# Patient Record
Sex: FEMALE | Race: WHITE | NOT HISPANIC OR LATINO | Employment: FULL TIME | URBAN - METROPOLITAN AREA
[De-identification: names, ages, dates, MRNs, and addresses within clinical notes are randomized per-mention and may not be internally consistent; named-entity substitution may affect disease eponyms.]

---

## 2023-04-17 DIAGNOSIS — F41.8 ANXIETY ASSOCIATED WITH DEPRESSION: ICD-10-CM

## 2023-04-17 PROBLEM — M79.10 MYALGIA: Status: ACTIVE | Noted: 2023-04-17

## 2023-04-17 PROBLEM — R10.31 ABDOMINAL PAIN, ACUTE, RIGHT LOWER QUADRANT: Status: ACTIVE | Noted: 2023-04-17

## 2023-04-17 PROBLEM — R00.0 ELEVATED PULSE RATE: Status: ACTIVE | Noted: 2023-04-17

## 2023-04-17 PROBLEM — J45.990 ASTHMA, EXERCISE INDUCED (HHS-HCC): Status: ACTIVE | Noted: 2023-04-17

## 2023-04-17 PROBLEM — B37.9 YEAST INFECTION: Status: ACTIVE | Noted: 2023-04-17

## 2023-04-17 PROBLEM — N20.0 RIGHT KIDNEY STONE: Status: ACTIVE | Noted: 2023-04-17

## 2023-04-17 PROBLEM — R06.81 WITNESSED APNEIC SPELLS: Status: ACTIVE | Noted: 2023-04-17

## 2023-04-17 PROBLEM — B00.1 COLD SORE: Status: ACTIVE | Noted: 2023-04-17

## 2023-04-17 PROBLEM — R07.89 ATYPICAL CHEST PAIN: Status: ACTIVE | Noted: 2023-04-17

## 2023-04-17 PROBLEM — R18.8 FREE FLUID IN PELVIS: Status: ACTIVE | Noted: 2023-04-17

## 2023-04-17 PROBLEM — A56.09 CHLAMYDIAL CERVICITIS: Status: ACTIVE | Noted: 2023-04-17

## 2023-04-17 PROBLEM — R35.0 URINARY FREQUENCY: Status: ACTIVE | Noted: 2023-04-17

## 2023-04-17 PROBLEM — R11.2 NAUSEA AND/OR VOMITING: Status: ACTIVE | Noted: 2023-04-17

## 2023-04-17 PROBLEM — H52.13 MYOPIA OF BOTH EYES: Status: ACTIVE | Noted: 2023-04-17

## 2023-04-17 PROBLEM — R07.89 CHEST PRESSURE: Status: ACTIVE | Noted: 2023-04-17

## 2023-04-17 PROBLEM — H47.093 ASYMMETRY OF OPTIC NERVE OF BOTH EYES: Status: ACTIVE | Noted: 2023-04-17

## 2023-04-17 PROBLEM — K64.5 THROMBOSED EXTERNAL HEMORRHOID: Status: ACTIVE | Noted: 2023-04-17

## 2023-04-17 PROBLEM — R00.2 HEART PALPITATIONS: Status: ACTIVE | Noted: 2023-04-17

## 2023-04-17 RX ORDER — BUPROPION HYDROCHLORIDE 300 MG/1
300 TABLET ORAL DAILY
Qty: 90 TABLET | Refills: 1 | Status: SHIPPED | OUTPATIENT
Start: 2023-04-17 | End: 2023-08-28 | Stop reason: SDUPTHER

## 2023-04-17 RX ORDER — BUPROPION HYDROCHLORIDE 300 MG/1
300 TABLET ORAL DAILY
COMMUNITY
End: 2023-04-17 | Stop reason: SDUPTHER

## 2023-08-28 DIAGNOSIS — F41.8 ANXIETY ASSOCIATED WITH DEPRESSION: ICD-10-CM

## 2023-08-28 PROBLEM — R09.81 NASAL CONGESTION: Status: ACTIVE | Noted: 2023-08-28

## 2023-08-28 PROBLEM — R49.0 HOARSENESS: Status: ACTIVE | Noted: 2023-08-28

## 2023-08-28 PROBLEM — R39.89 SUSPECTED UTI: Status: ACTIVE | Noted: 2023-08-28

## 2023-08-28 PROBLEM — R30.0 DYSURIA: Status: ACTIVE | Noted: 2023-08-28

## 2023-08-28 PROBLEM — L98.9 SKIN LESION OF FACE: Status: ACTIVE | Noted: 2023-08-28

## 2023-08-28 PROBLEM — R39.9 UTI SYMPTOMS: Status: ACTIVE | Noted: 2023-08-28

## 2023-08-28 PROBLEM — D23.39 OTHER BENIGN NEOPLASM OF SKIN OF OTHER PARTS OF FACE: Status: ACTIVE | Noted: 2023-03-21

## 2023-08-28 PROBLEM — B00.9 HERPESVIRAL INFECTION, UNSPECIFIED: Status: ACTIVE | Noted: 2023-03-21

## 2023-08-28 RX ORDER — BUPROPION HYDROCHLORIDE 150 MG/1
150 TABLET ORAL DAILY
COMMUNITY
End: 2023-08-31 | Stop reason: SDUPTHER

## 2023-08-28 RX ORDER — BUPROPION HYDROCHLORIDE 300 MG/1
300 TABLET ORAL DAILY
Qty: 90 TABLET | Refills: 1 | Status: SHIPPED | OUTPATIENT
Start: 2023-08-28 | End: 2023-12-14 | Stop reason: SDUPTHER

## 2023-08-28 RX ORDER — BUPROPION HYDROCHLORIDE 150 MG/1
150 TABLET ORAL DAILY
Qty: 90 TABLET | Refills: 1 | OUTPATIENT
Start: 2023-08-28

## 2023-08-29 LAB
CHLAMYDIA TRACH., AMPLIFIED: NEGATIVE
N. GONORRHEA, AMPLIFIED: NEGATIVE
TRICHOMONAS VAGINALIS: NEGATIVE

## 2023-08-31 DIAGNOSIS — F41.8 ANXIETY ASSOCIATED WITH DEPRESSION: Primary | ICD-10-CM

## 2023-08-31 RX ORDER — BUPROPION HYDROCHLORIDE 150 MG/1
150 TABLET ORAL DAILY
Qty: 90 TABLET | Refills: 1 | Status: SHIPPED | OUTPATIENT
Start: 2023-08-31 | End: 2023-12-14 | Stop reason: SDUPTHER

## 2023-08-31 NOTE — TELEPHONE ENCOUNTER
Best time to test is 3-5 days after start of symptoms to be most accurate; sometime first may be negative and next day it is positive   Would continue tylenol and/or advil cold and sinus or cold and flu preparations to help manage symptoms  Too soon to know if viral vs bacterial   If symptoms worsen over weekend and covid is negative can go to UC for evaluation

## 2023-08-31 NOTE — TELEPHONE ENCOUNTER
Patient called having fever, chills, congestion since yesterday, sinus pressure, feels awful can't focus has not taken a covid yest too sick,   Should she take one today or wait??  OTC claritin dayquil Advil

## 2023-11-16 RX ORDER — ONDANSETRON 4 MG/1
4 TABLET, ORALLY DISINTEGRATING ORAL
COMMUNITY
Start: 2021-09-01 | End: 2023-12-14 | Stop reason: WASHOUT

## 2023-11-16 RX ORDER — HYDROCORTISONE 25 MG/G
CREAM TOPICAL
COMMUNITY
Start: 2022-02-18 | End: 2023-12-14 | Stop reason: WASHOUT

## 2023-11-16 RX ORDER — NITROFURANTOIN 25; 75 MG/1; MG/1
1 CAPSULE ORAL EVERY 12 HOURS
COMMUNITY
Start: 2023-02-11 | End: 2023-12-14 | Stop reason: WASHOUT

## 2023-11-16 RX ORDER — ALBUTEROL SULFATE 90 UG/1
AEROSOL, METERED RESPIRATORY (INHALATION)
COMMUNITY
Start: 2021-08-20

## 2023-11-16 RX ORDER — TRETINOIN 0.5 MG/G
CREAM TOPICAL
COMMUNITY
Start: 2023-03-22 | End: 2023-12-14 | Stop reason: WASHOUT

## 2023-11-16 RX ORDER — IBUPROFEN 800 MG/1
800 TABLET ORAL EVERY 8 HOURS PRN
COMMUNITY
Start: 2023-03-22 | End: 2023-12-14 | Stop reason: WASHOUT

## 2023-11-16 RX ORDER — BUPROPION HYDROCHLORIDE 150 MG/1
150 TABLET, EXTENDED RELEASE ORAL DAILY
COMMUNITY
Start: 2023-07-11 | End: 2023-12-14 | Stop reason: SDUPTHER

## 2023-11-16 RX ORDER — HYDROCORTISONE ACETATE 25 MG/1
SUPPOSITORY RECTAL 2 TIMES DAILY
COMMUNITY
Start: 2022-02-18 | End: 2023-12-14 | Stop reason: WASHOUT

## 2023-11-16 RX ORDER — VALACYCLOVIR HYDROCHLORIDE 1 G/1
1000 TABLET, FILM COATED ORAL DAILY
COMMUNITY
Start: 2022-11-03 | End: 2024-03-29 | Stop reason: SDUPTHER

## 2023-11-16 RX ORDER — TRETINOIN 0.5 MG/G
CREAM TOPICAL
COMMUNITY
Start: 2023-03-21 | End: 2023-12-14 | Stop reason: WASHOUT

## 2023-11-16 RX ORDER — BUSPIRONE HYDROCHLORIDE 5 MG/1
5 TABLET ORAL
COMMUNITY
Start: 2021-09-07

## 2023-12-14 ENCOUNTER — OFFICE VISIT (OUTPATIENT)
Dept: PRIMARY CARE | Facility: CLINIC | Age: 28
End: 2023-12-14
Payer: COMMERCIAL

## 2023-12-14 VITALS
DIASTOLIC BLOOD PRESSURE: 80 MMHG | RESPIRATION RATE: 16 BRPM | HEART RATE: 92 BPM | SYSTOLIC BLOOD PRESSURE: 122 MMHG | WEIGHT: 152.8 LBS | BODY MASS INDEX: 24.56 KG/M2 | HEIGHT: 66 IN

## 2023-12-14 DIAGNOSIS — Z13.29 SCREENING FOR THYROID DISORDER: ICD-10-CM

## 2023-12-14 DIAGNOSIS — F41.8 ANXIETY ASSOCIATED WITH DEPRESSION: ICD-10-CM

## 2023-12-14 DIAGNOSIS — Z00.00 ROUTINE GENERAL MEDICAL EXAMINATION AT A HEALTH CARE FACILITY: Primary | ICD-10-CM

## 2023-12-14 DIAGNOSIS — Z13.220 SCREENING FOR LIPID DISORDERS: ICD-10-CM

## 2023-12-14 DIAGNOSIS — J45.990 ASTHMA, EXERCISE INDUCED (HHS-HCC): ICD-10-CM

## 2023-12-14 DIAGNOSIS — B00.1 COLD SORE: ICD-10-CM

## 2023-12-14 PROBLEM — R10.31 ABDOMINAL PAIN, ACUTE, RIGHT LOWER QUADRANT: Status: RESOLVED | Noted: 2023-04-17 | Resolved: 2023-12-14

## 2023-12-14 PROBLEM — A56.09 CHLAMYDIAL CERVICITIS: Status: RESOLVED | Noted: 2023-04-17 | Resolved: 2023-12-14

## 2023-12-14 PROBLEM — R18.8 FREE FLUID IN PELVIS: Status: RESOLVED | Noted: 2023-04-17 | Resolved: 2023-12-14

## 2023-12-14 PROBLEM — R11.2 NAUSEA AND/OR VOMITING: Status: RESOLVED | Noted: 2023-04-17 | Resolved: 2023-12-14

## 2023-12-14 PROBLEM — M79.10 MYALGIA: Status: RESOLVED | Noted: 2023-04-17 | Resolved: 2023-12-14

## 2023-12-14 PROBLEM — R00.0 ELEVATED PULSE RATE: Status: RESOLVED | Noted: 2023-04-17 | Resolved: 2023-12-14

## 2023-12-14 PROBLEM — R39.9 UTI SYMPTOMS: Status: RESOLVED | Noted: 2023-08-28 | Resolved: 2023-12-14

## 2023-12-14 PROBLEM — R49.0 HOARSENESS: Status: RESOLVED | Noted: 2023-08-28 | Resolved: 2023-12-14

## 2023-12-14 PROBLEM — R39.89 SUSPECTED UTI: Status: RESOLVED | Noted: 2023-08-28 | Resolved: 2023-12-14

## 2023-12-14 PROBLEM — K64.5 THROMBOSED EXTERNAL HEMORRHOID: Status: RESOLVED | Noted: 2023-04-17 | Resolved: 2023-12-14

## 2023-12-14 PROBLEM — R30.0 DYSURIA: Status: RESOLVED | Noted: 2023-08-28 | Resolved: 2023-12-14

## 2023-12-14 PROBLEM — B37.9 YEAST INFECTION: Status: RESOLVED | Noted: 2023-04-17 | Resolved: 2023-12-14

## 2023-12-14 PROBLEM — R07.89 ATYPICAL CHEST PAIN: Status: RESOLVED | Noted: 2023-04-17 | Resolved: 2023-12-14

## 2023-12-14 PROBLEM — N20.0 RIGHT KIDNEY STONE: Status: RESOLVED | Noted: 2023-04-17 | Resolved: 2023-12-14

## 2023-12-14 PROBLEM — R35.0 URINARY FREQUENCY: Status: RESOLVED | Noted: 2023-04-17 | Resolved: 2023-12-14

## 2023-12-14 PROBLEM — R07.89 CHEST PRESSURE: Status: RESOLVED | Noted: 2023-04-17 | Resolved: 2023-12-14

## 2023-12-14 PROCEDURE — 80061 LIPID PANEL: CPT

## 2023-12-14 PROCEDURE — 81003 URINALYSIS AUTO W/O SCOPE: CPT

## 2023-12-14 PROCEDURE — 80053 COMPREHEN METABOLIC PANEL: CPT

## 2023-12-14 PROCEDURE — 36415 COLL VENOUS BLD VENIPUNCTURE: CPT

## 2023-12-14 PROCEDURE — 85027 COMPLETE CBC AUTOMATED: CPT

## 2023-12-14 PROCEDURE — 84443 ASSAY THYROID STIM HORMONE: CPT

## 2023-12-14 PROCEDURE — 1036F TOBACCO NON-USER: CPT | Performed by: INTERNAL MEDICINE

## 2023-12-14 PROCEDURE — 99395 PREV VISIT EST AGE 18-39: CPT | Performed by: INTERNAL MEDICINE

## 2023-12-14 RX ORDER — BUPROPION HYDROCHLORIDE 150 MG/1
150 TABLET ORAL DAILY
Qty: 90 TABLET | Refills: 3 | Status: SHIPPED | OUTPATIENT
Start: 2023-12-14

## 2023-12-14 RX ORDER — BUPROPION HYDROCHLORIDE 300 MG/1
300 TABLET ORAL DAILY
Qty: 90 TABLET | Refills: 3 | Status: SHIPPED | OUTPATIENT
Start: 2023-12-14

## 2023-12-14 ASSESSMENT — ENCOUNTER SYMPTOMS
APNEA: 0
NUMBNESS: 0
MYALGIAS: 0
DYSPHORIC MOOD: 0
SPEECH DIFFICULTY: 0
CHILLS: 0
SLEEP DISTURBANCE: 0
BACK PAIN: 0
DIZZINESS: 0
WEAKNESS: 0
EYE DISCHARGE: 0
WHEEZING: 0
ANAL BLEEDING: 0
STRIDOR: 0
EYE PAIN: 0
RECTAL PAIN: 0
SEIZURES: 0
TROUBLE SWALLOWING: 0
APPETITE CHANGE: 0
ABDOMINAL PAIN: 0
COUGH: 0
DIFFICULTY URINATING: 0
PALPITATIONS: 0
ADENOPATHY: 0
LIGHT-HEADEDNESS: 0
RHINORRHEA: 0
WOUND: 0
FACIAL ASYMMETRY: 0
AGITATION: 0
FLANK PAIN: 0
SORE THROAT: 0
EYE REDNESS: 0
UNEXPECTED WEIGHT CHANGE: 0
HALLUCINATIONS: 0
CHOKING: 0
NAUSEA: 0
VOICE CHANGE: 0
NECK PAIN: 0
FACIAL SWELLING: 0
ACTIVITY CHANGE: 0
BRUISES/BLEEDS EASILY: 0
NECK STIFFNESS: 0
BLOOD IN STOOL: 0
HEMATURIA: 0
DYSURIA: 0
POLYPHAGIA: 0
FATIGUE: 0
ARTHRALGIAS: 1
FREQUENCY: 0
NERVOUS/ANXIOUS: 0
FEVER: 0
ABDOMINAL DISTENTION: 0
HEADACHES: 0
DECREASED CONCENTRATION: 0
CONFUSION: 0
PHOTOPHOBIA: 0
TREMORS: 0
SHORTNESS OF BREATH: 0
JOINT SWELLING: 0
SINUS PAIN: 0
CHEST TIGHTNESS: 0
HYPERACTIVE: 0
DIARRHEA: 0
COLOR CHANGE: 0
CONSTIPATION: 0
DIAPHORESIS: 0
SINUS PRESSURE: 0
VOMITING: 0
EYE ITCHING: 0
POLYDIPSIA: 0

## 2023-12-14 NOTE — PATIENT INSTRUCTIONS
We did blood work today and will call if anything is abnormal  Consider flu shot if interested  Continue healthy diet and exercise regularly   See GYN annually for exam  Continue medications as directed  Follow up in 1 year

## 2023-12-14 NOTE — PROGRESS NOTES
Subjective   Patient ID: Kavita Youssef is a 28 y.o. female who presents for Annual Exam.    HPI  Pt here for physical.  She is working out regularly.  She did have an injury to her left ankle in the spring.  She is finally recovered.  Her diet is consistent.      She still has some pain of the left ankle.  She notes worse with cold/damp weather.  She will take an ibuprofen if needed.  She will use topicals as well.      She sees GYN and had visit with normal pap/negative HPV in 8/2023.        Review of Systems   Constitutional:  Negative for activity change, appetite change, chills, diaphoresis, fatigue, fever and unexpected weight change.   HENT:  Negative for congestion, dental problem, drooling, ear discharge, ear pain, facial swelling, hearing loss, mouth sores, nosebleeds, postnasal drip, rhinorrhea, sinus pressure, sinus pain, sneezing, sore throat, tinnitus, trouble swallowing and voice change.    Eyes:  Positive for visual disturbance (wears glasses-not sure on last exam). Negative for photophobia, pain, discharge, redness and itching.   Respiratory:  Negative for apnea, cough, choking, chest tightness, shortness of breath, wheezing and stridor.    Cardiovascular:  Negative for chest pain, palpitations and leg swelling.   Gastrointestinal:  Negative for abdominal distention, abdominal pain, anal bleeding, blood in stool, constipation, diarrhea, nausea, rectal pain and vomiting.   Endocrine: Negative for cold intolerance, heat intolerance, polydipsia, polyphagia and polyuria.   Genitourinary:  Negative for decreased urine volume, difficulty urinating, dyspareunia, dysuria, enuresis, flank pain, frequency, genital sores, hematuria, menstrual problem, pelvic pain, urgency, vaginal bleeding, vaginal discharge and vaginal pain.   Musculoskeletal:  Positive for arthralgias (left ankle). Negative for back pain, gait problem, joint swelling, myalgias, neck pain and neck stiffness.   Skin:  Negative for color  "change, pallor, rash and wound.   Allergic/Immunologic: Negative for environmental allergies, food allergies and immunocompromised state.   Neurological:  Negative for dizziness, tremors, seizures, syncope, facial asymmetry, speech difficulty, weakness, light-headedness, numbness and headaches.   Hematological:  Negative for adenopathy. Does not bruise/bleed easily.   Psychiatric/Behavioral:  Negative for agitation, behavioral problems, confusion, decreased concentration, dysphoric mood, hallucinations, self-injury, sleep disturbance and suicidal ideas. The patient is not nervous/anxious and is not hyperactive.        Objective   /80 (BP Location: Right arm, Patient Position: Sitting)   Pulse 92   Resp 16   Ht 1.676 m (5' 6\")   Wt 69.3 kg (152 lb 12.8 oz)   BMI 24.66 kg/m²    Physical Exam  Constitutional:       Appearance: Normal appearance.   HENT:      Head: Normocephalic and atraumatic.      Right Ear: Tympanic membrane, ear canal and external ear normal. There is no impacted cerumen.      Left Ear: Tympanic membrane, ear canal and external ear normal. There is no impacted cerumen.      Nose: Nose normal. No congestion or rhinorrhea.      Mouth/Throat:      Mouth: Mucous membranes are moist.      Pharynx: Oropharynx is clear. No oropharyngeal exudate or posterior oropharyngeal erythema.   Eyes:      Extraocular Movements: Extraocular movements intact.      Conjunctiva/sclera: Conjunctivae normal.      Pupils: Pupils are equal, round, and reactive to light.   Neck:      Vascular: No carotid bruit.   Cardiovascular:      Rate and Rhythm: Normal rate and regular rhythm.      Pulses: Normal pulses.      Heart sounds: Normal heart sounds. No murmur heard.  Pulmonary:      Effort: Pulmonary effort is normal. No respiratory distress.      Breath sounds: Normal breath sounds. No wheezing, rhonchi or rales.   Abdominal:      General: Abdomen is flat. Bowel sounds are normal. There is no distension.      " Palpations: Abdomen is soft.      Tenderness: There is no abdominal tenderness.      Hernia: No hernia is present.   Musculoskeletal:         General: No swelling or tenderness. Normal range of motion.      Cervical back: Normal range of motion and neck supple.      Right lower leg: No edema.      Left lower leg: No edema.   Lymphadenopathy:      Cervical: No cervical adenopathy.   Skin:     General: Skin is warm and dry.      Findings: No lesion or rash.   Neurological:      General: No focal deficit present.      Mental Status: She is alert and oriented to person, place, and time.      Cranial Nerves: No cranial nerve deficit.      Sensory: No sensory deficit.      Motor: No weakness.   Psychiatric:         Mood and Affect: Mood normal.         Behavior: Behavior normal.         Thought Content: Thought content normal.         Judgment: Judgment normal.         Assessment/Plan   Problem List Items Addressed This Visit       Anxiety associated with depression     Anxiety well controlled on current regimen  Refills provided         Relevant Medications    buPROPion XL (Wellbutrin XL) 150 mg 24 hr tablet    buPROPion XL (Wellbutrin XL) 300 mg 24 hr tablet    Asthma, exercise induced    Cold sore     Uses valtrex daily for suppression           Other Visit Diagnoses       Routine general medical examination at a health care facility    -  Primary    Relevant Orders    Comprehensive Metabolic Panel    CBC    Urinalysis with Reflex Microscopic    Follow Up In Primary Care - Health Maintenance    Screening for lipid disorders        Relevant Orders    Lipid Panel    Screening for thyroid disorder        Relevant Orders    TSH

## 2023-12-15 LAB
ALBUMIN SERPL BCP-MCNC: 4.9 G/DL (ref 3.4–5)
ALP SERPL-CCNC: 43 U/L (ref 33–110)
ALT SERPL W P-5'-P-CCNC: 20 U/L (ref 7–45)
ANION GAP SERPL CALC-SCNC: 13 MMOL/L (ref 10–20)
APPEARANCE UR: ABNORMAL
AST SERPL W P-5'-P-CCNC: 16 U/L (ref 9–39)
BILIRUB SERPL-MCNC: 0.4 MG/DL (ref 0–1.2)
BILIRUB UR STRIP.AUTO-MCNC: NEGATIVE MG/DL
BUN SERPL-MCNC: 12 MG/DL (ref 6–23)
CALCIUM SERPL-MCNC: 10.2 MG/DL (ref 8.6–10.6)
CHLORIDE SERPL-SCNC: 102 MMOL/L (ref 98–107)
CHOLEST SERPL-MCNC: 139 MG/DL (ref 0–199)
CHOLESTEROL/HDL RATIO: 1.7
CO2 SERPL-SCNC: 28 MMOL/L (ref 21–32)
COLOR UR: ABNORMAL
CREAT SERPL-MCNC: 0.9 MG/DL (ref 0.5–1.05)
ERYTHROCYTE [DISTWIDTH] IN BLOOD BY AUTOMATED COUNT: 12.7 % (ref 11.5–14.5)
GFR SERPL CREATININE-BSD FRML MDRD: 88 ML/MIN/1.73M*2
GLUCOSE SERPL-MCNC: 91 MG/DL (ref 74–99)
GLUCOSE UR STRIP.AUTO-MCNC: NEGATIVE MG/DL
HCT VFR BLD AUTO: 42.5 % (ref 36–46)
HDLC SERPL-MCNC: 80.9 MG/DL
HGB BLD-MCNC: 14.1 G/DL (ref 12–16)
KETONES UR STRIP.AUTO-MCNC: ABNORMAL MG/DL
LDLC SERPL CALC-MCNC: 40 MG/DL
LEUKOCYTE ESTERASE UR QL STRIP.AUTO: NEGATIVE
MCH RBC QN AUTO: 29.6 PG (ref 26–34)
MCHC RBC AUTO-ENTMCNC: 33.2 G/DL (ref 32–36)
MCV RBC AUTO: 89 FL (ref 80–100)
NITRITE UR QL STRIP.AUTO: NEGATIVE
NON HDL CHOLESTEROL: 58 MG/DL (ref 0–149)
NRBC BLD-RTO: 0 /100 WBCS (ref 0–0)
PH UR STRIP.AUTO: 5 [PH]
PLATELET # BLD AUTO: 312 X10*3/UL (ref 150–450)
POTASSIUM SERPL-SCNC: 4.1 MMOL/L (ref 3.5–5.3)
PROT SERPL-MCNC: 7.4 G/DL (ref 6.4–8.2)
PROT UR STRIP.AUTO-MCNC: NEGATIVE MG/DL
RBC # BLD AUTO: 4.76 X10*6/UL (ref 4–5.2)
RBC # UR STRIP.AUTO: NEGATIVE /UL
SODIUM SERPL-SCNC: 139 MMOL/L (ref 136–145)
SP GR UR STRIP.AUTO: 1.02
TRIGL SERPL-MCNC: 89 MG/DL (ref 0–149)
TSH SERPL-ACNC: 1.03 MIU/L (ref 0.44–3.98)
UROBILINOGEN UR STRIP.AUTO-MCNC: <2 MG/DL
VLDL: 18 MG/DL (ref 0–40)
WBC # BLD AUTO: 9.7 X10*3/UL (ref 4.4–11.3)

## 2024-03-28 NOTE — PROGRESS NOTES
Subjective     Kavita Youssef is a 29 y.o. female who presents for the following: herpes simplex.     Established patient in for yearly appointment last seen one year ago and was prescribed valtrex.     Review of Systems:  No other skin or systemic complaints other than what is documented elsewhere in the note.    The following portions of the chart were reviewed this encounter and updated as appropriate:       Skin Cancer History  No skin cancer on file.    Specialty Problems          Dermatology Problems    Other benign neoplasm of skin of other parts of face    Skin lesion of face     Past Medical History:  Kavita Youssef  has a past medical history of Anxiety disorder, unspecified, Calculus of kidney (09/02/2021), Other chlamydial infection of lower genitourinary tract (11/03/2022), Perianal venous thrombosis (03/04/2022), Right kidney stone (04/17/2023), and Thrombosed external hemorrhoid (04/17/2023).    Past Surgical History:  Kavita Youssef  has a past surgical history that includes [place] tooth extraction.    Family History:  Patient family history includes Bipolar disorder in her mother; Coronary artery disease in her father; Diabetes type II in her father; Fibromyalgia in her mother; Schizophrenia in her mother.    Social History:  Kavita Youssef  reports that she has never smoked. She has never used smokeless tobacco. She reports current alcohol use. She reports that she does not use drugs.    Allergies:  Patient has no known allergies.    Current Medications / CAM's:    Current Outpatient Medications:     albuterol 90 mcg/actuation inhaler, Inhale., Disp: , Rfl:     buPROPion XL (Wellbutrin XL) 150 mg 24 hr tablet, Take 1 tablet (150 mg) by mouth once daily., Disp: 90 tablet, Rfl: 3    buPROPion XL (Wellbutrin XL) 300 mg 24 hr tablet, Take 1 tablet (300 mg) by mouth once daily., Disp: 90 tablet, Rfl: 3    busPIRone (Buspar) 5 mg tablet, Take 1 tablet (5 mg) by mouth., Disp: , Rfl:     valACYclovir  (Valtrex) 1 gram tablet, Take 1 tablet (1,000 mg) by mouth once daily., Disp: , Rfl:      Objective   Well appearing patient in no apparent distress; mood and affect are within normal limits.    Assessment/Plan

## 2024-03-29 ENCOUNTER — TELEMEDICINE (OUTPATIENT)
Dept: DERMATOLOGY | Facility: CLINIC | Age: 29
End: 2024-03-29
Payer: COMMERCIAL

## 2024-03-29 DIAGNOSIS — B00.9 HSV (HERPES SIMPLEX VIRUS) INFECTION: Primary | ICD-10-CM

## 2024-03-29 PROCEDURE — 99213 OFFICE O/P EST LOW 20 MIN: CPT | Performed by: NURSE PRACTITIONER

## 2024-03-29 RX ORDER — VALACYCLOVIR HYDROCHLORIDE 1 G/1
1000 TABLET, FILM COATED ORAL DAILY
Qty: 90 TABLET | Refills: 3 | Status: SHIPPED | OUTPATIENT
Start: 2024-03-29 | End: 2025-03-29

## 2024-03-29 ASSESSMENT — DERMATOLOGY QUALITY OF LIFE (QOL) ASSESSMENT
DATE THE QUALITY-OF-LIFE ASSESSMENT WAS COMPLETED: 66928
RATE HOW BOTHERED YOU ARE BY EFFECTS OF YOUR SKIN PROBLEMS ON YOUR ACTIVITIES (EG, GOING OUT, ACCOMPLISHING WHAT YOU WANT, WORK ACTIVITIES OR YOUR RELATIONSHIPS WITH OTHERS): 0 - NEVER BOTHERED
RATE HOW BOTHERED YOU ARE BY SYMPTOMS OF YOUR SKIN PROBLEM (EG, ITCHING, STINGING BURNING, HURTING OR SKIN IRRITATION): 0 - NEVER BOTHERED
RATE HOW BOTHERED YOU ARE BY EFFECTS OF YOUR SKIN PROBLEMS ON YOUR ACTIVITIES (EG, GOING OUT, ACCOMPLISHING WHAT YOU WANT, WORK ACTIVITIES OR YOUR RELATIONSHIPS WITH OTHERS): 0 - NEVER BOTHERED
RATE HOW BOTHERED YOU ARE BY SYMPTOMS OF YOUR SKIN PROBLEM (EG, ITCHING, STINGING BURNING, HURTING OR SKIN IRRITATION): 0 - NEVER BOTHERED
WHAT SINGLE SKIN CONDITION LISTED BELOW IS THE PATIENT ANSWERING THE QUALITY-OF-LIFE ASSESSMENT QUESTIONS ABOUT: NONE OF THE ABOVE
WHAT SINGLE SKIN CONDITION LISTED BELOW IS THE PATIENT ANSWERING THE QUALITY-OF-LIFE ASSESSMENT QUESTIONS ABOUT: NONE OF THE ABOVE
RATE HOW EMOTIONALLY BOTHERED YOU ARE BY YOUR SKIN PROBLEM (FOR EXAMPLE, WORRY, EMBARRASSMENT, FRUSTRATION): 0 - NEVER BOTHERED
RATE HOW EMOTIONALLY BOTHERED YOU ARE BY YOUR SKIN PROBLEM (FOR EXAMPLE, WORRY, EMBARRASSMENT, FRUSTRATION): 0 - NEVER BOTHERED

## 2024-03-29 ASSESSMENT — PATIENT GLOBAL ASSESSMENT (PGA): WHAT IS THE PGA: PATIENT GLOBAL ASSESSMENT:  1 - CLEAR

## 2024-05-06 ENCOUNTER — APPOINTMENT (OUTPATIENT)
Dept: OPHTHALMOLOGY | Facility: CLINIC | Age: 29
End: 2024-05-06
Payer: COMMERCIAL

## 2024-07-26 ENCOUNTER — APPOINTMENT (OUTPATIENT)
Dept: OPHTHALMOLOGY | Facility: CLINIC | Age: 29
End: 2024-07-26
Payer: COMMERCIAL

## 2024-07-26 DIAGNOSIS — H52.222 REGULAR ASTIGMATISM OF LEFT EYE: ICD-10-CM

## 2024-07-26 DIAGNOSIS — H52.13 MYOPIA OF BOTH EYES: Primary | ICD-10-CM

## 2024-07-26 PROCEDURE — 92015 DETERMINE REFRACTIVE STATE: CPT | Performed by: OPTOMETRIST

## 2024-07-26 PROCEDURE — 92014 COMPRE OPH EXAM EST PT 1/>: CPT | Performed by: OPTOMETRIST

## 2024-07-26 RX ORDER — AMOXICILLIN 500 MG/1
CAPSULE ORAL
COMMUNITY
Start: 2024-02-14

## 2024-07-26 ASSESSMENT — REFRACTION
OS_SPHERE: -2.75
OS_AXIS: 105
OS_CYLINDER: -1.00
OD_AXIS: 090
OD_CYLINDER: -0.50
OD_SPHERE: -3.50

## 2024-07-26 ASSESSMENT — ENCOUNTER SYMPTOMS
PSYCHIATRIC NEGATIVE: 0
ALLERGIC/IMMUNOLOGIC NEGATIVE: 0
CONSTITUTIONAL NEGATIVE: 0
RESPIRATORY NEGATIVE: 0
EYES NEGATIVE: 1
ENDOCRINE NEGATIVE: 0
HEMATOLOGIC/LYMPHATIC NEGATIVE: 0
MUSCULOSKELETAL NEGATIVE: 0
CARDIOVASCULAR NEGATIVE: 0
NEUROLOGICAL NEGATIVE: 0
GASTROINTESTINAL NEGATIVE: 0

## 2024-07-26 ASSESSMENT — CONF VISUAL FIELD
OS_INFERIOR_TEMPORAL_RESTRICTION: 0
OD_NORMAL: 1
OD_INFERIOR_NASAL_RESTRICTION: 0
OD_SUPERIOR_NASAL_RESTRICTION: 0
OS_SUPERIOR_TEMPORAL_RESTRICTION: 0
OS_SUPERIOR_NASAL_RESTRICTION: 0
OS_INFERIOR_NASAL_RESTRICTION: 0
OD_INFERIOR_TEMPORAL_RESTRICTION: 0
OD_SUPERIOR_TEMPORAL_RESTRICTION: 0
METHOD: COUNTING FINGERS
OS_NORMAL: 1

## 2024-07-26 ASSESSMENT — VISUAL ACUITY
OS_CC+: -2
OD_CC: 20/20 OU
OD_CC: 20/20
METHOD: SNELLEN - LINEAR
OD_CC+: -1
OS_CC: 20/20
CORRECTION_TYPE: GLASSES

## 2024-07-26 ASSESSMENT — SLIT LAMP EXAM - LIDS
COMMENTS: 2+ MGD
COMMENTS: 2+ MGD

## 2024-07-26 ASSESSMENT — REFRACTION_WEARINGRX
OS_CYLINDER: -0.75
OD_SPHERE: -3.75
OS_SPHERE: -2.50
OS_AXIS: 105

## 2024-07-26 ASSESSMENT — EXTERNAL EXAM - LEFT EYE: OS_EXAM: NORMAL

## 2024-07-26 ASSESSMENT — CUP TO DISC RATIO
OS_RATIO: .35
OD_RATIO: .45

## 2024-07-26 ASSESSMENT — REFRACTION_MANIFEST
OD_SPHERE: -3.75
OS_CYLINDER: -1.00
OS_AXIS: 105
OS_SPHERE: -2.75

## 2024-07-26 ASSESSMENT — TONOMETRY
IOP_METHOD: GOLDMANN APPLANATION
OS_IOP_MMHG: 14
OD_IOP_MMHG: 14

## 2024-07-26 ASSESSMENT — EXTERNAL EXAM - RIGHT EYE: OD_EXAM: NORMAL

## 2024-07-26 NOTE — PROGRESS NOTES
Assessment/Plan   Diagnoses and all orders for this visit:  Myopia of both eyes  Regular astigmatism of left eye  New spec rx released today per patient request. Ocular health wnl for age OU. Monitor 1 year or sooner prn. Refraction billed today.

## 2024-08-30 ENCOUNTER — APPOINTMENT (OUTPATIENT)
Dept: OBSTETRICS AND GYNECOLOGY | Facility: CLINIC | Age: 29
End: 2024-08-30
Payer: COMMERCIAL

## 2024-09-12 ENCOUNTER — APPOINTMENT (OUTPATIENT)
Dept: PRIMARY CARE | Facility: CLINIC | Age: 29
End: 2024-09-12
Payer: COMMERCIAL

## 2024-09-19 ENCOUNTER — LAB (OUTPATIENT)
Dept: LAB | Facility: LAB | Age: 29
End: 2024-09-19
Payer: COMMERCIAL

## 2024-09-19 ENCOUNTER — APPOINTMENT (OUTPATIENT)
Dept: OBSTETRICS AND GYNECOLOGY | Facility: CLINIC | Age: 29
End: 2024-09-19
Payer: COMMERCIAL

## 2024-09-19 VITALS
WEIGHT: 152.34 LBS | OXYGEN SATURATION: 98 % | SYSTOLIC BLOOD PRESSURE: 131 MMHG | HEART RATE: 92 BPM | HEIGHT: 66 IN | DIASTOLIC BLOOD PRESSURE: 81 MMHG | BODY MASS INDEX: 24.48 KG/M2

## 2024-09-19 DIAGNOSIS — N92.0 MENORRHAGIA WITH REGULAR CYCLE: ICD-10-CM

## 2024-09-19 DIAGNOSIS — N94.3 PMS (PREMENSTRUAL SYNDROME): ICD-10-CM

## 2024-09-19 DIAGNOSIS — Z11.3 SCREENING EXAMINATION FOR STI: ICD-10-CM

## 2024-09-19 DIAGNOSIS — Z01.419 WELL WOMAN EXAM WITH ROUTINE GYNECOLOGICAL EXAM: Primary | ICD-10-CM

## 2024-09-19 DIAGNOSIS — Z86.59 HISTORY OF DEPRESSION: ICD-10-CM

## 2024-09-19 LAB — TSH SERPL-ACNC: 1.01 MIU/L (ref 0.44–3.98)

## 2024-09-19 PROCEDURE — 87591 N.GONORRHOEAE DNA AMP PROB: CPT

## 2024-09-19 PROCEDURE — 87491 CHLMYD TRACH DNA AMP PROBE: CPT

## 2024-09-19 PROCEDURE — 87661 TRICHOMONAS VAGINALIS AMPLIF: CPT

## 2024-09-19 PROCEDURE — 84443 ASSAY THYROID STIM HORMONE: CPT

## 2024-09-19 PROCEDURE — 1036F TOBACCO NON-USER: CPT

## 2024-09-19 PROCEDURE — 99213 OFFICE O/P EST LOW 20 MIN: CPT

## 2024-09-19 PROCEDURE — 3008F BODY MASS INDEX DOCD: CPT

## 2024-09-19 PROCEDURE — 99395 PREV VISIT EST AGE 18-39: CPT

## 2024-09-19 PROCEDURE — 36415 COLL VENOUS BLD VENIPUNCTURE: CPT

## 2024-09-19 ASSESSMENT — ENCOUNTER SYMPTOMS
EYES NEGATIVE: 0
HEMATOLOGIC/LYMPHATIC NEGATIVE: 0
ENDOCRINE NEGATIVE: 0
CONSTITUTIONAL NEGATIVE: 0
NEUROLOGICAL NEGATIVE: 0
GASTROINTESTINAL NEGATIVE: 0
ALLERGIC/IMMUNOLOGIC NEGATIVE: 0
MUSCULOSKELETAL NEGATIVE: 1
CARDIOVASCULAR NEGATIVE: 0
RESPIRATORY NEGATIVE: 0
PSYCHIATRIC NEGATIVE: 1

## 2024-09-19 NOTE — PROGRESS NOTES
"Subjective   Kavita Youssef is a 29 y.o. female who is here for a routine exam.     GYN & Sexual Hx.:   - Last pap 8/2023, normal HPV Neg.  - History of abnormal Pap smear: no  - Contraception: None; not currently sexually active.  - Menstrual Periods: Regular, monthly.     Since the spring, has noticed that her cycles are heavier in flow, as well as will be 3-7 days early/later than expected, though still having regular, monthly periods.  Feels that her PMS symptoms are worsening 1-2 weeks prior to and during the time of her period.  Currently taking an antidepressant medication.    Went through a break-up in July, states was not a healthy relationship.    History of abnormal TSH a few years ago per patient.     Hx of chlamydia x 2; desires STI testing today.     Desires to conceive by age 33.    OB Hx.:   G0    Regular self breast exam: yes  Family history of breast cancer: no    Tobacco/alcohol/caffeine: alcohol intake:Rare, never smoker , and Illicit drugs: no history of illicit drug use    Diet: general  Exercise: regularly as directed    Review of Systems   All other systems reviewed and are negative.      Objective   /81 (BP Location: Right arm, Patient Position: Sitting, BP Cuff Size: Adult)   Pulse 92   Ht 1.676 m (5' 6\")   Wt 69.1 kg (152 lb 5.4 oz)   LMP 09/09/2024 (Exact Date)   SpO2 98%   BMI 24.59 kg/m²      General:   alert and oriented, in no acute distress           Lungs: Normal respiratory effort.   Breasts: Soft, symmetric, no skin dimpling or nipple discharge, no palpable masses or axillary nodes.   Abdomen: soft, non-tender, without masses or organomegaly   Vulva: normal, Bartholin's, Urethra, [place]'s normal   Vagina: normal mucosa, normal discharge   Cervix: no lesions           Neuro: age-appropriate affect, behavior and speech, no gross motor deficits, normal gait     Assessment      29 y.o. G0 woman for annual GYN exam.     - Health Maintenance --> Routine follow up with PCP " for health maintenance examination encouraged, including TSH, cholesterol, and Vit. D evaluation.  Self breast exam encouraged; concerning characteristics of breasts reviewed - Pt. will report general concerns, any adherent lumps, skin dimpling/puckering or color changes, and any nipple discharge.  Diet and exercise reviewed.   Pap due 2028 - Reviewed ACOG and ASCCP guidelines with pt.      - STI Screening -- Done today at patient request.     - Heavy Menses/PMS Sx --    - TSH done today,   - Reviewed options to help with PMS sx and heavy menses, including OCPs.   - Will also place psychiatry referral, names of providers given to patient.    - Patient states that she will consider options; open to adding another antidepressant medication to take during luteal phase, though wants to consider further.      - F/U 1 year or as needed.    Precious Cole, [email or web address]

## 2024-09-20 LAB
C TRACH RRNA SPEC QL NAA+PROBE: NEGATIVE
N GONORRHOEA DNA SPEC QL PROBE+SIG AMP: NEGATIVE
T VAGINALIS RRNA SPEC QL NAA+PROBE: NEGATIVE

## 2024-11-18 ENCOUNTER — APPOINTMENT (OUTPATIENT)
Dept: BEHAVIORAL HEALTH | Facility: CLINIC | Age: 29
End: 2024-11-18
Payer: COMMERCIAL

## 2024-11-18 DIAGNOSIS — N94.3 PMS (PREMENSTRUAL SYNDROME): ICD-10-CM

## 2024-11-18 DIAGNOSIS — Z86.59 HISTORY OF DEPRESSION: ICD-10-CM

## 2024-11-18 DIAGNOSIS — F41.9 ANXIETY: ICD-10-CM

## 2024-11-18 PROCEDURE — 99204 OFFICE O/P NEW MOD 45 MIN: CPT | Performed by: PSYCHIATRY & NEUROLOGY

## 2024-11-18 NOTE — PROGRESS NOTES
"Outpatient Psychiatry    Subjective   Kavita Youssef, a 29 y.o. female, presenting to Psychiatry for evaluation.  Patient is referred by Ana Luisa Arizmendi DO .      Virtual or Telephone Consent    An interactive audio and video telecommunication system which permits real time communications between the patient (at the originating site) and provider (at the distant site) was utilized to provide this telehealth service.   Verbal consent was requested and obtained from Kavita Youssef on this date, 24 for a telehealth visit.          Chief Complaint: \"I have been through a lot but am doing well now\"    HPI:    Patient has been on Wellbutrin  mg PO since .  This was a very difficult time for her.  She lost her father unexpectedly two days before her birthday from a heart attack.  She was very close with him.  She also lost her job at the same time and almost  in a car accident.  The patient was on Buspar for a period of time but has not used it in two years.  In  she weaned herself off of the Wellbutrin which put her in a \"weird place\"  where she had no control of her emotions.  Her PCP increased the Wellbutrin and has been on the higher dose since the end of .  Patient said that currently she feels good in terms of mental health   She is seeing her PCP in December - may see about decreasing the dose.  During the last year - hormornes are \"through the roof\"  Prior to her cycle she starts feeling an impending sense of doom and is not in control of her emotions before her period or right in beginning.  She feels oversensitive and cries a lot during this time period.  She can feel nauseous and breaks out in a cold sweat. When she gets her period the symptoms stop.   These symptoms come on very suddenly and have occurred intermittently for the last year. This past year she had a few terrible cycles.    Patient has done an \"over blanchard of her life\"   No ETOH, caffeine  Working out and eating well  2020 " "- after dad  she came back to [place]  Loves job, very supportive work environment. Loves boss  DONNA and MDD in past and medication worked very quickly   - went back to [place] - had job she hated, isolated, had issues with thyroid was \"miserable\"  Feels happy now with life   The patient has lost quite a bit of weight - in  was 170 lbs,  130 lbs  Now 145 lbs which she is happy with.  She is uncertain if the weight loss has \"messed with\" her hormones  She \"fixed\" thyroid on her own   Patient denies any side effects.   Patient denies SI, HI, manic or psychotic symptoms.      Psychiatric Review Of Systems:  Depressive Symptoms: negative  Manic Symptoms: negative  Anxiety Symptoms: Negative  OCD:  obsessions and/or compulsions  denies  Panic Disorder:  denies  Social Anxiety: denies  Psychotic Symptoms: negative  Other Symptoms:  PMDD - sense of doom crying, curled up in a ball  Sleep: good  Appetite: good  SI/HI:denies    Current Medications:  - Wellbutrin  mg PO daily      Medical History:  Past Medical History:   Diagnosis Date    Anxiety disorder, unspecified     Anxiety and depression    Calculus of kidney 2021    Right kidney stone    Other chlamydial infection of lower genitourinary tract 2022    Chlamydial cervicitis    Perianal venous thrombosis 2022    Thrombosed external hemorrhoid    Right kidney stone 2023    Thrombosed external hemorrhoid 2023     Head trauma no  Seizures no  Thyroid no    Past Psychiatric History:   Diagnoses:  anxiety and depression; PMDD  Previous Psychiatrist:   unknown  Therapy/past treatments: Buspar  Current psychiatric medications:  Wellbutrin  mg PO daily   Past psychiatric medications:  Buspar; Lexapro - made her angry; took for anxiety and depression  -  and at age 22 years old  Hospitalizations:none  Suicide attempts: none   Family psychiatric history: unknown    Social History:   Currently lives: [place], looking for " "house; lives with self and cat; best friend lives nearby; ex - spending time together - uncertain about future; doing \"friends\" things  Education:  [employer] then [employer]; [place]; [place]; [employer] and [place]  From [place] originally  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, boss  Family of origin: brother and sister, mom in [place] - Dad lived near [place]  Marital history/children: not marrried, no children  Current stressors: issues with cycle  Social support: best friend, famliy  Legal History: denies   History: denies  History of violence:  denies  Access to Weapons: denies  Interests:    Substance Use History:  Tobacco use: denies  Use of alcohol: denied  Use of caffeine: denies use  Use of other substances:   Legal consequences of substance use:   Substance use disorder treatment:     Record Review: moderate  Reviewed notes from PCP and labs, thyroid in particular    Lab on 09/19/2024   Component Date Value Ref Range Status    Thyroid Stimulating Hormone 09/19/2024 1.01  0.44 - 3.98 mIU/L Final   Office Visit on 09/19/2024   Component Date Value Ref Range Status    Neisseria gonorrhea,Amplified 09/19/2024 Negative  Negative Final    Chlamydia trachomatis, Amplified 09/19/2024 Negative  Negative Final    Trichomonas Vaginalis 09/19/2024 Negative  Negative, Invalid, TRICH neg Final          Medical Review Of Systems:  Pertinent items are noted in HPI.    OARRS:   reviewed today  No data recorded  I have personally reviewed the OARRS report for Kavita Youssef. I have considered the risks of abuse, dependence, addiction and diversion    Objective   Mental Status Exam  Appearance: long hair, glasses, in car, casually dressed  Attitude: Calm, cooperative, and engaged in conversation.  Behavior: Appropriate eye contact.   Motor Activity: No psychomotor agitation or retardation. No abnormal movements, tremors or tics. No evidence of extrapyramidal symptoms or tardive " "dyskinesia.  Speech: Regular rate, rhythm, volume. Spontaneous, no pressured speech.  Mood: \"pretty good\"  Affect: Euthymic, full range, mood congruent.  Thought Process: Linear, logical, and goal-directed. No loose associations or gross thought disorganization.  Thought Content: Denied current suicidal ideation or thoughts of harm to self, denied homicidal ideation or thoughts of harm to others. No delusional thinking elicited. No perseverations or obsessions identified.   Perception: Did not endorse auditory or visual hallucinations, did not appear to be responding to hallucinatory stimuli.   Cognition: Alert, oriented x3. Preserved attention span and concentration, recent and remote memory. Adequate fund of knowledge. No deficits in language.   Insight: Fair, in regards to understanding mental health condition  Judgement: Fair      Vitals:  There were no vitals filed for this visit.    9/19/2024 from OB/GYN    Vitals: /81 (BP Location: Right arm, Patient Position: Sitting, BP Cuff Size: Adult)     Pulse 92     Ht 1.676 m (5' 6\")     Wt 69.1 kg (152 lb 5.4 oz)     LMP 09/09/2024 (Exact Date)     SpO2 98%     BMI 24.59 kg/m²     BSA 1.79 m²       BMI:  24.59    Falls Risk:  n/a    Risk Assessment:  Risk of harm to self: low - no history of SI, SAs, plans, self-harm    Risk of harm to others: Low Risk - Risk factors include: No significant risk factors identified on screening. Protective factors include: Lack of known history of harm to others , Lack of known history of violent ideation , Lack of known access to firearms , Sense of community, availability/access to resources and support , Sense of optimism, hope , Interpersonal competence , Affect regulation , Sense of self-efficacy, internal locus of control , and Positive, pro-social family/peer network     DXS:   Anxiety, unspecified  Depression, unspecified  PMDD vs. PMD    Assessment:  Patient is a 29 year old female with a history of depression and " anxiety who is currently stable on Wellbutrin  mg PO daily prescribed by her PCP.  Patient reports being on Wellbutrin  mg since 2020 when she had depression due to multiple stressors such as death of her father, loss of her job and almost dying in a car accident.  She briefly took herself off of Wellbutrin in 2021 but realized she needed to get back on it as her symptoms quickly returned.  Patient reports a history for the past year of very disabling symptoms prior to her period consistent with a possible diagnosis of PMDD vs PMS, although the patient does not get these symptoms every month and felt fine during her last cycle.  Patient is going to monitor symptoms and if they persist, may consider alternative medication such as an SSRI.     Discussed medication risks and benefits     Patient denies SI, HI, manic or psychotic symptoms.  Denies insomnia or anorexia.      No side effects reported.       Plan/Recommendations:  Medications: continue Wellbutrin  mg PO daily  Orders: none  Follow up: 8 weeks  Call  Psychiatry at [phone] with issues.  For [facility] residents, [employer] is a 24/7 hotline you can call for assistance [109.102.1018]. Please call [phone] or go to your closest Emergency Room if you feel unsafe. This includes thoughts of hurting yourself or anyone else, or having other troubles such as hearing voices, seeing visions, or having new and scary thoughts about the people around you.    Review with patient: Treatment plan reviewed with the patient.  Medication risks/benefit reviewed with the patient          Rosmery Rivera MD

## 2024-12-17 ENCOUNTER — APPOINTMENT (OUTPATIENT)
Dept: PRIMARY CARE | Facility: CLINIC | Age: 29
End: 2024-12-17
Payer: COMMERCIAL

## 2024-12-17 VITALS
TEMPERATURE: 98.4 F | HEIGHT: 66 IN | HEART RATE: 87 BPM | RESPIRATION RATE: 14 BRPM | WEIGHT: 153.69 LBS | DIASTOLIC BLOOD PRESSURE: 75 MMHG | SYSTOLIC BLOOD PRESSURE: 108 MMHG | OXYGEN SATURATION: 99 % | BODY MASS INDEX: 24.7 KG/M2

## 2024-12-17 DIAGNOSIS — R59.0 REACTIVE CERVICAL LYMPHADENOPATHY: ICD-10-CM

## 2024-12-17 DIAGNOSIS — B00.9 HSV (HERPES SIMPLEX VIRUS) INFECTION: ICD-10-CM

## 2024-12-17 DIAGNOSIS — F41.8 ANXIETY ASSOCIATED WITH DEPRESSION: ICD-10-CM

## 2024-12-17 DIAGNOSIS — B00.9 HERPESVIRAL INFECTION, UNSPECIFIED: ICD-10-CM

## 2024-12-17 DIAGNOSIS — Z13.29 SCREENING FOR THYROID DISORDER: ICD-10-CM

## 2024-12-17 DIAGNOSIS — J45.990 ASTHMA, EXERCISE INDUCED (HHS-HCC): ICD-10-CM

## 2024-12-17 DIAGNOSIS — Z00.00 ROUTINE GENERAL MEDICAL EXAMINATION AT A HEALTH CARE FACILITY: Primary | ICD-10-CM

## 2024-12-17 DIAGNOSIS — Z13.220 SCREENING FOR LIPID DISORDERS: ICD-10-CM

## 2024-12-17 PROBLEM — R09.81 NASAL CONGESTION: Status: RESOLVED | Noted: 2023-08-28 | Resolved: 2024-12-17

## 2024-12-17 PROCEDURE — 3008F BODY MASS INDEX DOCD: CPT | Performed by: INTERNAL MEDICINE

## 2024-12-17 PROCEDURE — 99395 PREV VISIT EST AGE 18-39: CPT | Performed by: INTERNAL MEDICINE

## 2024-12-17 PROCEDURE — 1036F TOBACCO NON-USER: CPT | Performed by: INTERNAL MEDICINE

## 2024-12-17 RX ORDER — VALACYCLOVIR HYDROCHLORIDE 1 G/1
1000 TABLET, FILM COATED ORAL DAILY
Qty: 90 TABLET | Refills: 3 | Status: SHIPPED | OUTPATIENT
Start: 2024-12-17 | End: 2025-12-17

## 2024-12-17 RX ORDER — BUPROPION HYDROCHLORIDE 150 MG/1
150 TABLET ORAL DAILY
Qty: 90 TABLET | Refills: 3 | Status: CANCELLED | OUTPATIENT
Start: 2024-12-17

## 2024-12-17 RX ORDER — BUPROPION HYDROCHLORIDE 300 MG/1
300 TABLET ORAL DAILY
Qty: 90 TABLET | Refills: 3 | Status: SHIPPED | OUTPATIENT
Start: 2024-12-17

## 2024-12-17 RX ORDER — ALBUTEROL SULFATE 90 UG/1
2 INHALANT RESPIRATORY (INHALATION) EVERY 6 HOURS PRN
Qty: 18 G | Refills: 1 | Status: SHIPPED | OUTPATIENT
Start: 2024-12-17

## 2024-12-17 ASSESSMENT — ENCOUNTER SYMPTOMS
TROUBLE SWALLOWING: 0
EYE ITCHING: 0
SEIZURES: 0
DIZZINESS: 0
HYPERACTIVE: 0
DYSPHORIC MOOD: 1
ANAL BLEEDING: 0
ADENOPATHY: 0
BLOOD IN STOOL: 0
DYSURIA: 0
VOMITING: 0
COLOR CHANGE: 0
FACIAL ASYMMETRY: 0
UNEXPECTED WEIGHT CHANGE: 0
PALPITATIONS: 0
CONSTIPATION: 0
JOINT SWELLING: 0
EYE PAIN: 0
CHOKING: 0
FACIAL SWELLING: 0
RHINORRHEA: 0
VOICE CHANGE: 0
EYE DISCHARGE: 0
DIAPHORESIS: 0
LIGHT-HEADEDNESS: 0
SHORTNESS OF BREATH: 0
BACK PAIN: 0
WOUND: 0
WHEEZING: 0
STRIDOR: 0
SINUS PRESSURE: 0
APNEA: 0
COUGH: 0
CHEST TIGHTNESS: 0
NERVOUS/ANXIOUS: 0
WEAKNESS: 0
CONFUSION: 0
SINUS PAIN: 0
APPETITE CHANGE: 0
FATIGUE: 0
DIFFICULTY URINATING: 0
HEMATURIA: 0
SLEEP DISTURBANCE: 0
POLYPHAGIA: 0
NAUSEA: 0
PHOTOPHOBIA: 0
HEADACHES: 0
NECK PAIN: 0
ARTHRALGIAS: 1
FREQUENCY: 0
ACTIVITY CHANGE: 0
TREMORS: 0
NUMBNESS: 0
POLYDIPSIA: 0
SORE THROAT: 0
FLANK PAIN: 0
FEVER: 0
BRUISES/BLEEDS EASILY: 0
RECTAL PAIN: 0
ABDOMINAL DISTENTION: 0
EYE REDNESS: 0
CHILLS: 0
HALLUCINATIONS: 0
ABDOMINAL PAIN: 0
DECREASED CONCENTRATION: 0
MYALGIAS: 0
SPEECH DIFFICULTY: 0
NECK STIFFNESS: 0
AGITATION: 0
DIARRHEA: 0

## 2024-12-17 ASSESSMENT — PATIENT HEALTH QUESTIONNAIRE - PHQ9
2. FEELING DOWN, DEPRESSED OR HOPELESS: NOT AT ALL
1. LITTLE INTEREST OR PLEASURE IN DOING THINGS: NOT AT ALL
SUM OF ALL RESPONSES TO PHQ9 QUESTIONS 1 AND 2: 0

## 2024-12-17 NOTE — PATIENT INSTRUCTIONS
Wean down dose of Wellbutrin by taking only 300 mg daily at this time-can use up your 150 mg doses by taking 2 at same time  Get fasting blood work done when able (drink water/black coffee in AM)  Consider hearing test due to some loss noted  Continue to monitor lymph node behind right ear-as long as it stays small, doesn't get enlarged and stay big/hard/fixed then it is benign and just reactive  Continue healthy diet and exercise regularly   Consider flu shot  Continue to see GYN annually for exam  Follow up here in 1 year-sooner if needed

## 2024-12-17 NOTE — PROGRESS NOTES
Subjective   Patient ID: Kavita Youssef is a 29 y.o. female who presents for Annual Exam.    HPI  Pt here for full physical.  She doesn't watch her diet overall.  She exercises 4-5 times a week.      She wants to try to decrease her Wellbutrin dose.  She would like to try 300 mg only.  She feels overall her mood has improved.  She is using light therapy to help.  She does have some increase mood/sensitivities before her menstrual cycle.  She did see psych for this.      She had normal pap in 2023.  She sees GYN. She had well visit in 9/2024.  No pelvic or urinary issues.      She stretches and uses massage gun to manage soreness from working out-shoulder/foot/knees at times.      She has a nodule base of scalp on right that comes and goes and sometimes is very tender to palpation.  She had chiropractor look at it and felt likely reactive lymph node.  Currently nodule is small.      Review of Systems   Constitutional:  Negative for activity change, appetite change, chills, diaphoresis, fatigue, fever and unexpected weight change.   HENT:  Positive for hearing loss. Negative for congestion, dental problem, drooling, ear discharge, ear pain, facial swelling, mouth sores, nosebleeds, postnasal drip, rhinorrhea, sinus pressure, sinus pain, sneezing, sore throat, tinnitus, trouble swallowing and voice change.    Eyes:  Negative for photophobia, pain, discharge, redness, itching and visual disturbance (wears glasses-had recent exam).   Respiratory:  Negative for apnea, cough, choking, chest tightness, shortness of breath, wheezing and stridor.    Cardiovascular:  Negative for chest pain, palpitations and leg swelling.   Gastrointestinal:  Negative for abdominal distention, abdominal pain, anal bleeding, blood in stool, constipation, diarrhea, nausea, rectal pain and vomiting.   Endocrine: Negative for cold intolerance, heat intolerance, polydipsia, polyphagia and polyuria.   Genitourinary:  Negative for decreased urine  "volume, difficulty urinating, dyspareunia, dysuria, enuresis, flank pain, frequency, genital sores, hematuria, menstrual problem, pelvic pain, urgency, vaginal bleeding, vaginal discharge and vaginal pain.   Musculoskeletal:  Positive for arthralgias. Negative for back pain, gait problem, joint swelling, myalgias, neck pain and neck stiffness.   Skin:  Negative for color change, pallor, rash and wound.   Allergic/Immunologic: Negative for environmental allergies, food allergies and immunocompromised state.   Neurological:  Negative for dizziness, tremors, seizures, syncope, facial asymmetry, speech difficulty, weakness, light-headedness, numbness and headaches.   Hematological:  Negative for adenopathy. Does not bruise/bleed easily.   Psychiatric/Behavioral:  Positive for dysphoric mood. Negative for agitation, behavioral problems, confusion, decreased concentration, hallucinations, self-injury, sleep disturbance and suicidal ideas. The patient is not nervous/anxious and is not hyperactive.        Objective   /75 (BP Location: Right arm, Patient Position: Sitting)   Pulse 87   Temp 36.9 °C (98.4 °F) (Oral)   Resp 14   Ht 1.676 m (5' 6\")   Wt 69.7 kg (153 lb 11 oz)   SpO2 99%   BMI 24.81 kg/m²      Physical Exam  Constitutional:       Appearance: Normal appearance.   HENT:      Head: Normocephalic and atraumatic.      Right Ear: Tympanic membrane, ear canal and external ear normal. There is no impacted cerumen.      Left Ear: Tympanic membrane, ear canal and external ear normal. There is no impacted cerumen.      Nose: Nose normal. No congestion or rhinorrhea.      Mouth/Throat:      Mouth: Mucous membranes are moist.      Pharynx: Oropharynx is clear. No oropharyngeal exudate or posterior oropharyngeal erythema.   Eyes:      Extraocular Movements: Extraocular movements intact.      Conjunctiva/sclera: Conjunctivae normal.      Pupils: Pupils are equal, round, and reactive to light.   Neck:      " Vascular: No carotid bruit.   Cardiovascular:      Rate and Rhythm: Normal rate and regular rhythm.      Pulses: Normal pulses.      Heart sounds: Normal heart sounds. No murmur heard.  Pulmonary:      Effort: Pulmonary effort is normal. No respiratory distress.      Breath sounds: Normal breath sounds. No wheezing, rhonchi or rales.   Abdominal:      General: Abdomen is flat. Bowel sounds are normal. There is no distension.      Palpations: Abdomen is soft.      Tenderness: There is no abdominal tenderness.      Hernia: No hernia is present.   Musculoskeletal:         General: No swelling or tenderness. Normal range of motion.      Cervical back: Normal range of motion and neck supple.      Right lower leg: No edema.      Left lower leg: No edema.   Lymphadenopathy:      Cervical: Cervical adenopathy (very tiny freely mobile posterior lymph node noted right side without pain on palpation) present.   Skin:     General: Skin is warm and dry.      Findings: No lesion or rash.   Neurological:      General: No focal deficit present.      Mental Status: She is alert and oriented to person, place, and time.      Cranial Nerves: No cranial nerve deficit.      Sensory: No sensory deficit.      Motor: No weakness.   Psychiatric:         Mood and Affect: Mood normal.         Behavior: Behavior normal.         Thought Content: Thought content normal.         Judgment: Judgment normal.         Assessment/Plan   Problem List Items Addressed This Visit       Anxiety associated with depression     Mood has improved  She wishes to try lower dose  Will do wellbutrin 300 mg instead of 450 mg/day and monitor          Relevant Medications    buPROPion XL (Wellbutrin XL) 300 mg 24 hr tablet    Asthma, exercise induced ([facility])     Uses inhaler PRN          Relevant Medications    albuterol 90 mcg/actuation inhaler    Herpesviral infection, unspecified     Uses valtrex daily for preventative measures          Other Visit Diagnoses        Routine general medical examination at a health care facility    -  Primary    Relevant Orders    Comprehensive Metabolic Panel    CBC    Follow Up In Primary Care - Health Maintenance    HSV (herpes simplex virus) infection        Relevant Medications    valACYclovir (Valtrex) 1 gram tablet    Screening for lipid disorders        Relevant Orders    Lipid Panel    Screening for thyroid disorder        Relevant Orders    TSH with reflex to Free T4 if abnormal    Reactive cervical lymphadenopathy

## 2024-12-17 NOTE — ASSESSMENT & PLAN NOTE
Mood has improved  She wishes to try lower dose  Will do wellbutrin 300 mg instead of 450 mg/day and monitor

## 2024-12-19 ENCOUNTER — LAB (OUTPATIENT)
Dept: LAB | Facility: LAB | Age: 29
End: 2024-12-19
Payer: COMMERCIAL

## 2024-12-19 DIAGNOSIS — Z00.00 ROUTINE GENERAL MEDICAL EXAMINATION AT A HEALTH CARE FACILITY: ICD-10-CM

## 2024-12-19 DIAGNOSIS — Z13.29 SCREENING FOR THYROID DISORDER: ICD-10-CM

## 2024-12-19 DIAGNOSIS — Z13.220 SCREENING FOR LIPID DISORDERS: ICD-10-CM

## 2024-12-19 LAB
ALBUMIN SERPL BCP-MCNC: 4.3 G/DL (ref 3.4–5)
ALP SERPL-CCNC: 47 U/L (ref 33–110)
ALT SERPL W P-5'-P-CCNC: 32 U/L (ref 7–45)
ANION GAP SERPL CALC-SCNC: 10 MMOL/L (ref 10–20)
AST SERPL W P-5'-P-CCNC: 26 U/L (ref 9–39)
BILIRUB SERPL-MCNC: 0.5 MG/DL (ref 0–1.2)
BUN SERPL-MCNC: 12 MG/DL (ref 6–23)
CALCIUM SERPL-MCNC: 9.5 MG/DL (ref 8.6–10.6)
CHLORIDE SERPL-SCNC: 103 MMOL/L (ref 98–107)
CHOLEST SERPL-MCNC: 136 MG/DL (ref 0–199)
CHOLESTEROL/HDL RATIO: 2
CO2 SERPL-SCNC: 31 MMOL/L (ref 21–32)
CREAT SERPL-MCNC: 0.91 MG/DL (ref 0.5–1.05)
EGFRCR SERPLBLD CKD-EPI 2021: 87 ML/MIN/1.73M*2
ERYTHROCYTE [DISTWIDTH] IN BLOOD BY AUTOMATED COUNT: 12.2 % (ref 11.5–14.5)
GLUCOSE SERPL-MCNC: 100 MG/DL (ref 74–99)
HCT VFR BLD AUTO: 42.3 % (ref 36–46)
HDLC SERPL-MCNC: 66.4 MG/DL
HGB BLD-MCNC: 13.7 G/DL (ref 12–16)
LDLC SERPL CALC-MCNC: 52 MG/DL
MCH RBC QN AUTO: 29.5 PG (ref 26–34)
MCHC RBC AUTO-ENTMCNC: 32.4 G/DL (ref 32–36)
MCV RBC AUTO: 91 FL (ref 80–100)
NON HDL CHOLESTEROL: 70 MG/DL (ref 0–149)
NRBC BLD-RTO: 0 /100 WBCS (ref 0–0)
PLATELET # BLD AUTO: 321 X10*3/UL (ref 150–450)
POTASSIUM SERPL-SCNC: 4.2 MMOL/L (ref 3.5–5.3)
PROT SERPL-MCNC: 6.7 G/DL (ref 6.4–8.2)
RBC # BLD AUTO: 4.65 X10*6/UL (ref 4–5.2)
SODIUM SERPL-SCNC: 140 MMOL/L (ref 136–145)
TRIGL SERPL-MCNC: 88 MG/DL (ref 0–149)
TSH SERPL-ACNC: 1.54 MIU/L (ref 0.44–3.98)
VLDL: 18 MG/DL (ref 0–40)
WBC # BLD AUTO: 6.5 X10*3/UL (ref 4.4–11.3)

## 2024-12-19 PROCEDURE — 80061 LIPID PANEL: CPT

## 2024-12-19 PROCEDURE — 36415 COLL VENOUS BLD VENIPUNCTURE: CPT

## 2024-12-19 PROCEDURE — 80053 COMPREHEN METABOLIC PANEL: CPT

## 2024-12-19 PROCEDURE — 85027 COMPLETE CBC AUTOMATED: CPT

## 2024-12-19 PROCEDURE — 84443 ASSAY THYROID STIM HORMONE: CPT

## 2025-01-29 ENCOUNTER — APPOINTMENT (OUTPATIENT)
Dept: BEHAVIORAL HEALTH | Facility: CLINIC | Age: 30
End: 2025-01-29
Payer: COMMERCIAL

## 2025-03-20 ENCOUNTER — APPOINTMENT (OUTPATIENT)
Dept: OBSTETRICS AND GYNECOLOGY | Facility: CLINIC | Age: 30
End: 2025-03-20
Payer: COMMERCIAL

## 2025-04-04 ENCOUNTER — APPOINTMENT (OUTPATIENT)
Dept: DERMATOLOGY | Facility: CLINIC | Age: 30
End: 2025-04-04
Payer: COMMERCIAL

## 2025-04-04 DIAGNOSIS — L70.0 ACNE VULGARIS: Primary | ICD-10-CM

## 2025-04-04 DIAGNOSIS — B00.9 HSV (HERPES SIMPLEX VIRUS) INFECTION: ICD-10-CM

## 2025-04-04 PROCEDURE — 99213 OFFICE O/P EST LOW 20 MIN: CPT | Performed by: NURSE PRACTITIONER

## 2025-04-04 PROCEDURE — 1036F TOBACCO NON-USER: CPT | Performed by: NURSE PRACTITIONER

## 2025-04-04 RX ORDER — VALACYCLOVIR HYDROCHLORIDE 1 G/1
1000 TABLET, FILM COATED ORAL DAILY
Qty: 90 TABLET | Refills: 3 | Status: SHIPPED | OUTPATIENT
Start: 2025-04-04 | End: 2026-04-04

## 2025-04-04 NOTE — PROGRESS NOTES
Subjective     Kavita Youssef is a 30 y.o. female who presents for the following: herpes simplex.    (Follow up for herpes simplex. Pt requesting refill of Valtrex. Pt reports condition is stable. / /).     Review of Systems:  No other skin or systemic complaints other than what is documented elsewhere in the note.    The following portions of the chart were reviewed this encounter and updated as appropriate:          Skin Cancer History  No skin cancer on file.      Specialty Problems          Dermatology Problems    Other benign neoplasm of skin of other parts of face    Skin lesion of face        Objective   Well appearing patient in no apparent distress; mood and affect are within normal limits.    A focused skin examination was performed. All findings within normal limits unless otherwise noted below.    Assessment/Plan   1. Acne vulgaris  Head - Anterior (Face)  Deep seated, inflammatory papules on jaw line at menstruation     -Discussed the nature of the condition  -Discussed treatment options  -Patient declines treatment.  She has a blue light at home   -Recommend:    2. HSV (herpes simplex virus) infection  Right Upper [place] Lip  Patient hasn't had a new outbreak since starting daily Valtrex.     Related Procedures  Follow Up In Dermatology - Established Patient    Related Medications  valACYclovir (Valtrex) 1 gram tablet  Take 1 tablet (1,000 mg) by mouth once daily.

## 2025-04-25 ENCOUNTER — APPOINTMENT (OUTPATIENT)
Facility: CLINIC | Age: 30
End: 2025-04-25
Payer: COMMERCIAL

## 2025-04-25 VITALS
DIASTOLIC BLOOD PRESSURE: 81 MMHG | WEIGHT: 153 LBS | BODY MASS INDEX: 24.59 KG/M2 | SYSTOLIC BLOOD PRESSURE: 117 MMHG | HEIGHT: 66 IN

## 2025-04-25 DIAGNOSIS — J34.3 HYPERTROPHY OF BOTH INFERIOR NASAL TURBINATES: ICD-10-CM

## 2025-04-25 DIAGNOSIS — J30.9 CHRONIC ALLERGIC RHINITIS: Primary | ICD-10-CM

## 2025-04-25 PROCEDURE — 1036F TOBACCO NON-USER: CPT | Performed by: OTOLARYNGOLOGY

## 2025-04-25 PROCEDURE — 3008F BODY MASS INDEX DOCD: CPT | Performed by: OTOLARYNGOLOGY

## 2025-04-25 PROCEDURE — 99204 OFFICE O/P NEW MOD 45 MIN: CPT | Performed by: OTOLARYNGOLOGY

## 2025-04-25 RX ORDER — FLUTICASONE PROPIONATE 50 MCG
2 SPRAY, SUSPENSION (ML) NASAL DAILY
Qty: 48 G | Refills: 3 | Status: SHIPPED | OUTPATIENT
Start: 2025-04-25 | End: 2026-04-25

## 2025-04-25 NOTE — PROGRESS NOTES
Impression:  1. Chronic allergic rhinitis        2. Hypertrophy of both inferior nasal turbinates             RECOMMENDATIONS/PLAN :  I reassured the patient there is no evidence of any intranasal polyps today.  More than likely she is dealing with persistent allergies.  We will place her back on Flonase nasal spray-2 puffs each nostril daily for the next 2 to 3 months.  I also want her to rinse the sinuses using a sinus rinse kit 3 times per week.  If she does not have any improvement, we will then add Allegra or Zyrtec.  As a last resort we can also have her formally allergy tested.  She can otherwise follow-up with her family physician as needed.      **This electronic medical record note was created with the use of voice recognition software.  Despite proofreading, typographical or grammatical errors may be present that could affect meaning of content **    Subjective   Patient ID:     Kavita Youssef is a 30 y.o. female who presents to the office today complaining of persistent nasal congestion with clear rhinorrhea.  She denies repetitive sinus infections or any fever or chills.  She thought she may have nasal polyps however nobody has ever seen polyps in her nose.  She did use Flonase for about 2 weeks but has not been on anything recently.     ROS:  A detailed 12 system review of systems is noted on the intake form has been reviewed with the patient with details noted in the HPI and scanned into the patient's medical record.    Objective     Medical History[1]     Surgical History[2]     RX Allergies[3]     Current Medications[4]     Tobacco Use: Low Risk  (4/25/2025)    Patient History     Smoking Tobacco Use: Never     Smokeless Tobacco Use: Never     Passive Exposure: Not on file        Alcohol Use: Unknown (1/6/2021)    Received from [facility]    AUDIT-C     Frequency of Alcohol Consumption: 2-3 times a week     Average Number of Drinks: Not on file     Frequency of Binge Drinking: Not on file     "    Social History     Substance and Sexual Activity   Drug Use Never        Physical Exam:  Visit Vitals  /81 (BP Location: Right arm, Patient Position: Sitting, BP Cuff Size: Adult)   Ht 1.676 m (5' 6\")   Wt 69.4 kg (153 lb)   BMI 24.69 kg/m²   Smoking Status Never   BSA 1.8 m²      General: Patient is alert, oriented, cooperative in no apparent distress.  Head: Normocephalic, atraumatic.  Eyes: PERRL, EOMI, Conjunctiva is clear. No nystagmus.  Ears: Right Ear-- Pinna is normal.  External auditory canal is patent. Tympanic membrane is [intact, translucent and has good mobility with my pneumatic otoscope. No effusion].  Mastoid is nontender.  Left ear-- Pinna is normal.  External auditory canal is patent. Tympanic membrane is [intact, translucent and has good mobility with my pneumatic otoscope.  No effusion].  Mastoid is nontender.  Nose: Septum is relatively straight.  No septal perforation or lesions. No septal hematoma/ seroma.  No signs of bleeding.  Inferior turbinates are moderately swollen and pale.   No evidence of intranasal polyps.  No infectious drainage.  Throat:  Floor of mouth is clear, no masses.  Tongue appears normal, no lesions or masses. Gums, gingiva, buccal mucosa appear pink and moist, no lesions. Teeth are in fair repair.  No obvious dental infections.  Peritonsillar regions appear symmetric without swelling.  Hard and soft palate appear normal, no obvious cleft. Uvula is midline.  Oropharynx: No lesions. Retropharyngeal wall is flat.  No active postnasal drip.  Neck: Supple,  no lymphadenopathy.  No masses.  Salivary Glands: Symmetric bilaterally.  No palpable masses.  No evidence of acute infection or salivary stones  Neurologic: Cranial Nerves 2-12 are grossly intact without focal deficits. Cerebellar function testing is normal.     Results:   []    Procedure:   []    Jero Sanchez DO        [1]   Past Medical History:  Diagnosis Date    Anxiety disorder, unspecified     Anxiety and " depression    Calculus of kidney 09/02/2021    Right kidney stone    Other chlamydial infection of lower genitourinary tract 11/03/2022    Chlamydial cervicitis    Perianal venous thrombosis 03/04/2022    Thrombosed external hemorrhoid    Right kidney stone 04/17/2023    Thrombosed external hemorrhoid 04/17/2023   [2]   Past Surgical History:  Procedure Laterality Date    WISDOM TOOTH EXTRACTION     [3] No Known Allergies  [4]   Current Outpatient Medications:     albuterol 90 mcg/actuation inhaler, Inhale 2 puffs every 6 hours if needed for wheezing., Disp: 18 g, Rfl: 1    buPROPion XL (Wellbutrin XL) 300 mg 24 hr tablet, Take 1 tablet (300 mg) by mouth once daily., Disp: 90 tablet, Rfl: 3    busPIRone (Buspar) 5 mg tablet, Take 1 tablet (5 mg) by mouth. (Patient not taking: Reported on 12/17/2024), Disp: , Rfl:     valACYclovir (Valtrex) 1 gram tablet, Take 1 tablet (1,000 mg) by mouth once daily., Disp: 90 tablet, Rfl: 3

## 2025-05-27 ENCOUNTER — TELEPHONE (OUTPATIENT)
Dept: PRIMARY CARE | Facility: CLINIC | Age: 30
End: 2025-05-27
Payer: COMMERCIAL

## 2025-05-27 DIAGNOSIS — N91.2 AMENORRHEA: Primary | ICD-10-CM

## 2025-05-27 NOTE — TELEPHONE ENCOUNTER
Patient called stating thinks she is pregnant, but OB can't see her til this  Friday, wants confirmation. Asking if you can order a blood test. She is 18 days late, took a pregnancy test at home that was , it came back positive. So she went to the store and bought 2 more tests  and 1 was positive and 1 was negative. She is concerned and wants a defiant answer if she is pregnant would like confirmation with a blood test.

## 2025-05-28 ENCOUNTER — TELEPHONE (OUTPATIENT)
Dept: PRIMARY CARE | Facility: CLINIC | Age: 30
End: 2025-05-28
Payer: COMMERCIAL

## 2025-05-28 LAB — B-HCG SERPL-ACNC: ABNORMAL MIU/ML

## 2025-05-28 NOTE — TELEPHONE ENCOUNTER
Patient informed and already saw results online, [facility]

## 2025-05-28 NOTE — TELEPHONE ENCOUNTER
----- Message from Ana Luisa Arizmendi sent at 5/28/2025 12:44 PM EDT -----  Appears she is pregnant-make her OB appt  ----- Message -----  From: [employer] [facility] Results In  Sent: 5/28/2025   8:16 AM EDT  To: Ana Luisa ZAPATA DO

## 2025-05-30 ENCOUNTER — APPOINTMENT (OUTPATIENT)
Dept: OBSTETRICS AND GYNECOLOGY | Facility: CLINIC | Age: 30
End: 2025-05-30
Payer: COMMERCIAL

## 2025-07-11 ENCOUNTER — OFFICE VISIT (OUTPATIENT)
Dept: URGENT CARE | Age: 30
End: 2025-07-11
Payer: COMMERCIAL

## 2025-07-11 VITALS
SYSTOLIC BLOOD PRESSURE: 110 MMHG | RESPIRATION RATE: 16 BRPM | HEART RATE: 88 BPM | OXYGEN SATURATION: 99 % | TEMPERATURE: 98.1 F | DIASTOLIC BLOOD PRESSURE: 73 MMHG

## 2025-07-11 DIAGNOSIS — L30.9 DERMATITIS: Primary | ICD-10-CM

## 2025-07-11 DIAGNOSIS — A46 ERYSIPELAS: ICD-10-CM

## 2025-07-11 RX ORDER — TRIAMCINOLONE ACETONIDE 1 MG/G
OINTMENT TOPICAL 2 TIMES DAILY
Qty: 60 G | Refills: 0 | Status: SHIPPED | OUTPATIENT
Start: 2025-07-11 | End: 2025-07-21

## 2025-07-11 RX ORDER — PREDNISONE 20 MG/1
40 TABLET ORAL DAILY
Qty: 10 TABLET | Refills: 0 | Status: SHIPPED | OUTPATIENT
Start: 2025-07-11 | End: 2025-07-16

## 2025-07-11 RX ORDER — DOXYCYCLINE 100 MG/1
100 CAPSULE ORAL 2 TIMES DAILY
Qty: 20 CAPSULE | Refills: 0 | Status: SHIPPED | OUTPATIENT
Start: 2025-07-11 | End: 2025-07-21

## 2025-07-11 ASSESSMENT — PAIN SCALES - GENERAL: PAINLEVEL_OUTOF10: 0-NO PAIN

## 2025-07-11 NOTE — PROGRESS NOTES
Subjective   Patient ID: Kavita Youssef is a 30 y.o. female. They present today with a chief complaint of Rash (Red, itchy rash on B/L arms and legs X 6 days. ).    History of Present Illness    Rash      Patient presents to urgent care for a chief complaint of diffuse spreading rash that is itchy over the last 6 days patient states that rash started on her neck and chest and then migrated to upper and lower extremities, patient has tried over-the-counter remedies such as aloe, hydrocortisone has been taking Benadryl denies that rash is painful  Past Medical History  Allergies as of 07/11/2025    (No Known Allergies)       Prescriptions Prior to Admission[1]     Medical History[2]    Surgical History[3]     reports that she has never smoked. She has never used smokeless tobacco. She reports that she does not currently use alcohol. She reports that she does not use drugs.    Review of Systems  Review of Systems   Skin:  Positive for rash.   All other systems reviewed and are negative.                                 Objective    Vitals:    07/11/25 1831   BP: 110/73   Pulse: 88   Resp: 16   Temp: 36.7 °C (98.1 °F)   SpO2: 99%     Patient's last menstrual period was 04/11/2025 (exact date).    Physical Exam  Vitals and nursing note reviewed.   Constitutional:       General: She is not in acute distress.     Appearance: Normal appearance. She is not ill-appearing, toxic-appearing or diaphoretic.   Cardiovascular:      Rate and Rhythm: Normal rate.   Pulmonary:      Effort: Pulmonary effort is normal. No respiratory distress.      Breath sounds: Normal breath sounds.   Skin:     Findings: Rash present.      Comments: Diffuse erythematous rash nonvesicular located on upper extremities lower extremities and chest, also area of chest with large scratch with surrounding erythema with well-defined margins none maculopapular nonvesicular, does shravan with palpation   Neurological:      General: No focal deficit present.       Mental Status: She is alert and oriented to person, place, and time.   Psychiatric:         Mood and Affect: Mood normal.         Behavior: Behavior normal.         Procedures    Point of Care Test & Imaging Results from this visit  No results found for this visit on 07/11/25.   Imaging  No results found.    Cardiology, Vascular, and Other Imaging  No other imaging results found for the past 2 days      Diagnostic study results (if any) were reviewed by Ben Palma PA-C.    Assessment/Plan   Allergies, medications, history, and pertinent labs/EKGs/Imaging reviewed by Ben Palma PA-C.     Medical Decision Making  I did discuss with the patient I believe she may have contact dermatitis as report of new sunscreen, patient will be placed on prednisone burst which is to start tomorrow I did discuss with patient avoiding NSAIDs such as ibuprofen Advil Aleve or Motrin may take Tylenol, will also place on triamcinolone topical, and will place on doxycycline for the treatment of erysipelas.  If no resolution regression of symptoms in 5 to 7 days may return to urgent care for evaluation or follow-up with primary care.  Discharge emergent care A+O x 4 stable condition no signs of distress    Orders and Diagnoses  Diagnoses and all orders for this visit:  Dermatitis  -     predniSONE (Deltasone) 20 mg tablet; Take 2 tablets (40 mg) by mouth once daily for 5 days.  -     triamcinolone (Kenalog) 0.1 % ointment; Apply topically 2 times a day for 10 days.  Erysipelas  -     doxycycline (Vibramycin) 100 mg capsule; Take 1 capsule (100 mg) by mouth 2 times a day for 10 days. Take with at least 8 ounces (large glass) of water, do not lie down for 30 minutes after      Medical Admin Record      Patient disposition: Home    Electronically signed by Ben Palma PA-C  7:07 PM           [1] (Not in a hospital admission)   [2]   Past Medical History:  Diagnosis Date    Anxiety disorder, unspecified     Anxiety and  depression    Calculus of kidney 09/02/2021    Right kidney stone    Other chlamydial infection of lower genitourinary tract 11/03/2022    Chlamydial cervicitis    Perianal venous thrombosis 03/04/2022    Thrombosed external hemorrhoid    Right kidney stone 04/17/2023    Thrombosed external hemorrhoid 04/17/2023   [3]   Past Surgical History:  Procedure Laterality Date    WISDOM TOOTH EXTRACTION

## 2025-09-23 ENCOUNTER — APPOINTMENT (OUTPATIENT)
Dept: OBSTETRICS AND GYNECOLOGY | Facility: CLINIC | Age: 30
End: 2025-09-23
Payer: COMMERCIAL

## 2025-09-24 ENCOUNTER — APPOINTMENT (OUTPATIENT)
Dept: OBSTETRICS AND GYNECOLOGY | Facility: CLINIC | Age: 30
End: 2025-09-24
Payer: COMMERCIAL

## 2026-01-06 ENCOUNTER — APPOINTMENT (OUTPATIENT)
Dept: PRIMARY CARE | Facility: CLINIC | Age: 31
End: 2026-01-06
Payer: COMMERCIAL

## 2026-04-09 ENCOUNTER — APPOINTMENT (OUTPATIENT)
Dept: DERMATOLOGY | Facility: CLINIC | Age: 31
End: 2026-04-09
Payer: COMMERCIAL